# Patient Record
Sex: MALE | Race: WHITE | NOT HISPANIC OR LATINO | Employment: FULL TIME | URBAN - NONMETROPOLITAN AREA
[De-identification: names, ages, dates, MRNs, and addresses within clinical notes are randomized per-mention and may not be internally consistent; named-entity substitution may affect disease eponyms.]

---

## 2019-08-14 DIAGNOSIS — R07.9 CHEST PAIN, UNSPECIFIED TYPE: Primary | ICD-10-CM

## 2019-09-03 ENCOUNTER — APPOINTMENT (OUTPATIENT)
Dept: NUCLEAR MEDICINE | Facility: HOSPITAL | Age: 54
End: 2019-09-03

## 2019-09-04 ENCOUNTER — APPOINTMENT (OUTPATIENT)
Dept: CARDIOLOGY | Facility: HOSPITAL | Age: 54
End: 2019-09-04

## 2019-09-04 ENCOUNTER — TELEPHONE (OUTPATIENT)
Dept: GENERAL RADIOLOGY | Facility: HOSPITAL | Age: 54
End: 2019-09-04

## 2019-09-04 ENCOUNTER — APPOINTMENT (OUTPATIENT)
Dept: NUCLEAR MEDICINE | Facility: HOSPITAL | Age: 54
End: 2019-09-04

## 2019-09-05 ENCOUNTER — APPOINTMENT (OUTPATIENT)
Dept: CARDIOLOGY | Facility: HOSPITAL | Age: 54
End: 2019-09-05

## 2019-09-05 ENCOUNTER — APPOINTMENT (OUTPATIENT)
Dept: NUCLEAR MEDICINE | Facility: HOSPITAL | Age: 54
End: 2019-09-05

## 2019-09-18 ENCOUNTER — HOSPITAL ENCOUNTER (OUTPATIENT)
Dept: NUCLEAR MEDICINE | Facility: HOSPITAL | Age: 54
Discharge: HOME | End: 2019-09-18

## 2019-09-18 DIAGNOSIS — R07.9 CHEST PAIN, UNSPECIFIED TYPE: ICD-10-CM

## 2019-09-18 PROCEDURE — 93017 CV STRESS TEST TRACING ONLY: CPT

## 2019-09-18 PROCEDURE — A9500 TC99M SESTAMIBI: HCPCS | Performed by: INTERNAL MEDICINE

## 2019-09-18 PROCEDURE — 78452 HT MUSCLE IMAGE SPECT MULT: CPT

## 2019-09-18 PROCEDURE — 0 TECHNETIUM SESTAMIBI: Performed by: INTERNAL MEDICINE

## 2019-09-18 RX ORDER — TRAZODONE HYDROCHLORIDE 150 MG/1
150 TABLET ORAL NIGHTLY
COMMUNITY

## 2019-09-18 RX ORDER — MAGNESIUM OXIDE 400 MG/1
400 TABLET ORAL 2 TIMES DAILY
COMMUNITY

## 2019-09-18 RX ORDER — DIAZEPAM 5 MG/1
5 TABLET ORAL NIGHTLY PRN
COMMUNITY

## 2019-09-18 RX ORDER — MELOXICAM 15 MG/1
15 TABLET ORAL DAILY
COMMUNITY

## 2019-09-18 RX ORDER — ISOSORBIDE MONONITRATE 30 MG/1
30 TABLET, EXTENDED RELEASE ORAL DAILY
Status: ON HOLD | COMMUNITY
End: 2020-01-23

## 2019-09-18 RX ORDER — PROPRANOLOL HYDROCHLORIDE 40 MG/1
40 TABLET ORAL 2 TIMES DAILY
COMMUNITY
End: 2019-09-19

## 2019-09-18 RX ADMIN — TECHNETIUM TC 99M SESTAMIBI 1 DOSE: 1 INJECTION INTRAVENOUS at 07:27

## 2019-09-19 ENCOUNTER — HOSPITAL ENCOUNTER (OUTPATIENT)
Dept: CARDIOLOGY | Facility: HOSPITAL | Age: 54
Discharge: HOME | End: 2019-09-19

## 2019-09-19 ENCOUNTER — HOSPITAL ENCOUNTER (OUTPATIENT)
Dept: NUCLEAR MEDICINE | Facility: HOSPITAL | Age: 54
Discharge: HOME | End: 2019-09-19

## 2019-09-19 PROCEDURE — A9500 TC99M SESTAMIBI: HCPCS | Performed by: INTERNAL MEDICINE

## 2019-09-19 PROCEDURE — 0 TECHNETIUM SESTAMIBI: Performed by: INTERNAL MEDICINE

## 2019-09-19 RX ORDER — PROPRANOLOL HYDROCHLORIDE 40 MG/1
40 TABLET ORAL DAILY
Status: ON HOLD | COMMUNITY
End: 2020-01-23

## 2019-09-19 RX ADMIN — TECHNETIUM TC 99M SESTAMIBI 1 DOSE: 1 INJECTION INTRAVENOUS at 08:17

## 2019-10-22 LAB
BH CV STRESS BP STAGE 1: NORMAL
BH CV STRESS BP STAGE 2: NORMAL
BH CV STRESS BP STAGE 3: NORMAL
BH CV STRESS DURATION MIN STAGE 1: 3
BH CV STRESS DURATION MIN STAGE 2: 3
BH CV STRESS DURATION MIN STAGE 3: 3
BH CV STRESS DURATION SEC STAGE 1: 0
BH CV STRESS DURATION SEC STAGE 2: 0
BH CV STRESS DURATION SEC STAGE 3: 0
BH CV STRESS GRADE STAGE 1: 10
BH CV STRESS GRADE STAGE 2: 12
BH CV STRESS GRADE STAGE 3: 14
BH CV STRESS HR STAGE 1: 97
BH CV STRESS HR STAGE 2: 122
BH CV STRESS HR STAGE 3: 151
BH CV STRESS METS STAGE 1: 5
BH CV STRESS METS STAGE 2: 7.5
BH CV STRESS METS STAGE 3: 10
BH CV STRESS PROTOCOL 1: NORMAL
BH CV STRESS RECOVERY BP: NORMAL MMHG
BH CV STRESS RECOVERY HR: 92 BPM
BH CV STRESS SPEED STAGE 1: 1.7
BH CV STRESS SPEED STAGE 2: 2.5
BH CV STRESS SPEED STAGE 3: 3.4
BH CV STRESS STAGE 1: 1
BH CV STRESS STAGE 2: 2
BH CV STRESS STAGE 3: 3
LV EF NUC BP: 79 %
MAXIMAL PREDICTED HEART RATE: 165 BPM
PERCENT MAX PREDICTED HR: 91.52 %
STRESS BASELINE BP: NORMAL MMHG
STRESS BASELINE HR: 71 BPM
STRESS PERCENT HR: 108 %
STRESS POST ESTIMATED WORKLOAD: 10.4 METS
STRESS POST EXERCISE DUR MIN: 8 MIN
STRESS POST EXERCISE DUR SEC: 25 SEC
STRESS POST PEAK BP: NORMAL MMHG
STRESS POST PEAK HR: 151 BPM
STRESS TARGET HR: 140 BPM

## 2020-01-23 ENCOUNTER — APPOINTMENT (OUTPATIENT)
Dept: GENERAL RADIOLOGY | Facility: HOSPITAL | Age: 55
End: 2020-01-23

## 2020-01-23 ENCOUNTER — HOSPITAL ENCOUNTER (OUTPATIENT)
Facility: HOSPITAL | Age: 55
Setting detail: OBSERVATION
Discharge: HOME | End: 2020-01-24
Attending: EMERGENCY MEDICINE | Admitting: INTERNAL MEDICINE

## 2020-01-23 DIAGNOSIS — I11.9 BENIGN HYPERTENSIVE HEART DISEASE WITHOUT HEART FAILURE: ICD-10-CM

## 2020-01-23 DIAGNOSIS — I49.3 PVC (PREMATURE VENTRICULAR CONTRACTION): ICD-10-CM

## 2020-01-23 DIAGNOSIS — R55 NEAR SYNCOPE: ICD-10-CM

## 2020-01-23 DIAGNOSIS — I10 BENIGN HYPERTENSION: ICD-10-CM

## 2020-01-23 DIAGNOSIS — R07.9 CHEST PAIN, UNSPECIFIED TYPE: Primary | ICD-10-CM

## 2020-01-23 DIAGNOSIS — R00.2 HEART PALPITATIONS: ICD-10-CM

## 2020-01-23 LAB
ALBUMIN SERPL-MCNC: 4.4 G/DL (ref 3.5–5.2)
ALBUMIN/GLOB SERPL: 1.5 G/DL
ALP SERPL-CCNC: 30 U/L (ref 39–117)
ALT SERPL W P-5'-P-CCNC: 27 U/L (ref 1–41)
ANION GAP SERPL CALCULATED.3IONS-SCNC: 13 MMOL/L (ref 5–15)
AST SERPL-CCNC: 17 U/L (ref 1–40)
BASOPHILS # BLD AUTO: 0.02 10*3/MM3 (ref 0–0.2)
BASOPHILS NFR BLD AUTO: 0.2 % (ref 0–1.5)
BILIRUB SERPL-MCNC: 0.6 MG/DL (ref 0.2–1.2)
BUN BLD-MCNC: 14 MG/DL (ref 6–20)
BUN/CREAT SERPL: 11.6 (ref 7–25)
CALCIUM SPEC-SCNC: 9.5 MG/DL (ref 8.6–10.5)
CHLORIDE SERPL-SCNC: 101 MMOL/L (ref 98–107)
CHOLEST SERPL-MCNC: 218 MG/DL (ref 0–200)
CO2 SERPL-SCNC: 24 MMOL/L (ref 22–29)
CREAT BLD-MCNC: 1.21 MG/DL (ref 0.76–1.27)
DEPRECATED RDW RBC AUTO: 36.7 FL (ref 37–54)
EOSINOPHIL # BLD AUTO: 0.04 10*3/MM3 (ref 0–0.4)
EOSINOPHIL NFR BLD AUTO: 0.4 % (ref 0.3–6.2)
ERYTHROCYTE [DISTWIDTH] IN BLOOD BY AUTOMATED COUNT: 11.5 % (ref 12.3–15.4)
GFR SERPL CREATININE-BSD FRML MDRD: 62 ML/MIN/1.73
GLOBULIN UR ELPH-MCNC: 3 GM/DL
GLUCOSE BLD-MCNC: 99 MG/DL (ref 65–99)
HCT VFR BLD AUTO: 44.9 % (ref 37.5–51)
HDLC SERPL-MCNC: 49 MG/DL (ref 40–60)
HGB BLD-MCNC: 15.7 G/DL (ref 13–17.7)
HOLD SPECIMEN: NORMAL
HOLD SPECIMEN: NORMAL
IMM GRANULOCYTES # BLD AUTO: 0.07 10*3/MM3 (ref 0–0.05)
IMM GRANULOCYTES NFR BLD AUTO: 0.7 % (ref 0–0.5)
LDLC SERPL CALC-MCNC: 148 MG/DL (ref 0–100)
LDLC/HDLC SERPL: 3.03 {RATIO}
LYMPHOCYTES # BLD AUTO: 2.59 10*3/MM3 (ref 0.7–3.1)
LYMPHOCYTES NFR BLD AUTO: 27.7 % (ref 19.6–45.3)
MAGNESIUM SERPL-MCNC: 2.3 MG/DL (ref 1.6–2.6)
MCH RBC QN AUTO: 31 PG (ref 26.6–33)
MCHC RBC AUTO-ENTMCNC: 35 G/DL (ref 31.5–35.7)
MCV RBC AUTO: 88.7 FL (ref 79–97)
MONOCYTES # BLD AUTO: 0.71 10*3/MM3 (ref 0.1–0.9)
MONOCYTES NFR BLD AUTO: 7.6 % (ref 5–12)
NEUTROPHILS # BLD AUTO: 5.93 10*3/MM3 (ref 1.7–7)
NEUTROPHILS NFR BLD AUTO: 63.4 % (ref 42.7–76)
NRBC BLD AUTO-RTO: 0 /100 WBC (ref 0–0.2)
NT-PROBNP SERPL-MCNC: 119.2 PG/ML (ref 5–900)
PLATELET # BLD AUTO: 268 10*3/MM3 (ref 140–450)
PMV BLD AUTO: 10.5 FL (ref 6–12)
POTASSIUM BLD-SCNC: 4.1 MMOL/L (ref 3.5–5.2)
PROT SERPL-MCNC: 7.4 G/DL (ref 6–8.5)
RBC # BLD AUTO: 5.06 10*6/MM3 (ref 4.14–5.8)
SODIUM BLD-SCNC: 138 MMOL/L (ref 136–145)
TRIGL SERPL-MCNC: 103 MG/DL (ref 0–150)
TROPONIN T SERPL-MCNC: <0.01 NG/ML (ref 0–0.03)
TROPONIN T SERPL-MCNC: <0.01 NG/ML (ref 0–0.03)
VLDLC SERPL-MCNC: 20.6 MG/DL
WBC NRBC COR # BLD: 9.36 10*3/MM3 (ref 3.4–10.8)
WHOLE BLOOD HOLD SPECIMEN: NORMAL
WHOLE BLOOD HOLD SPECIMEN: NORMAL

## 2020-01-23 PROCEDURE — G0378 HOSPITAL OBSERVATION PER HR: HCPCS

## 2020-01-23 PROCEDURE — 36415 COLL VENOUS BLD VENIPUNCTURE: CPT | Performed by: EMERGENCY MEDICINE

## 2020-01-23 PROCEDURE — 93005 ELECTROCARDIOGRAM TRACING: CPT

## 2020-01-23 PROCEDURE — 71045 X-RAY EXAM CHEST 1 VIEW: CPT

## 2020-01-23 PROCEDURE — 96365 THER/PROPH/DIAG IV INF INIT: CPT

## 2020-01-23 PROCEDURE — 84484 ASSAY OF TROPONIN QUANT: CPT | Performed by: EMERGENCY MEDICINE

## 2020-01-23 PROCEDURE — 80061 LIPID PANEL: CPT | Performed by: INTERNAL MEDICINE

## 2020-01-23 PROCEDURE — 25010000002 ENOXAPARIN PER 10 MG: Performed by: INTERNAL MEDICINE

## 2020-01-23 PROCEDURE — 80053 COMPREHEN METABOLIC PANEL: CPT | Performed by: EMERGENCY MEDICINE

## 2020-01-23 PROCEDURE — 83735 ASSAY OF MAGNESIUM: CPT | Performed by: EMERGENCY MEDICINE

## 2020-01-23 PROCEDURE — 85025 COMPLETE CBC W/AUTO DIFF WBC: CPT | Performed by: EMERGENCY MEDICINE

## 2020-01-23 PROCEDURE — 25010000002 MAGNESIUM SULFATE 2 GM/50ML SOLUTION: Performed by: EMERGENCY MEDICINE

## 2020-01-23 PROCEDURE — 96361 HYDRATE IV INFUSION ADD-ON: CPT

## 2020-01-23 PROCEDURE — 93005 ELECTROCARDIOGRAM TRACING: CPT | Performed by: EMERGENCY MEDICINE

## 2020-01-23 PROCEDURE — 96372 THER/PROPH/DIAG INJ SC/IM: CPT

## 2020-01-23 PROCEDURE — 96366 THER/PROPH/DIAG IV INF ADDON: CPT

## 2020-01-23 PROCEDURE — 83880 ASSAY OF NATRIURETIC PEPTIDE: CPT | Performed by: EMERGENCY MEDICINE

## 2020-01-23 PROCEDURE — 99284 EMERGENCY DEPT VISIT MOD MDM: CPT

## 2020-01-23 PROCEDURE — 93010 ELECTROCARDIOGRAM REPORT: CPT | Performed by: INTERNAL MEDICINE

## 2020-01-23 RX ORDER — ASPIRIN 81 MG/1
81 TABLET ORAL DAILY
Status: DISCONTINUED | OUTPATIENT
Start: 2020-01-23 | End: 2020-01-24 | Stop reason: HOSPADM

## 2020-01-23 RX ORDER — NITROGLYCERIN 0.4 MG/1
0.4 TABLET SUBLINGUAL
Status: DISCONTINUED | OUTPATIENT
Start: 2020-01-23 | End: 2020-01-24 | Stop reason: HOSPADM

## 2020-01-23 RX ORDER — SODIUM CHLORIDE 0.9 % (FLUSH) 0.9 %
10 SYRINGE (ML) INJECTION AS NEEDED
Status: DISCONTINUED | OUTPATIENT
Start: 2020-01-23 | End: 2020-01-24 | Stop reason: HOSPADM

## 2020-01-23 RX ORDER — ONDANSETRON 4 MG/1
4 TABLET, FILM COATED ORAL EVERY 6 HOURS PRN
Status: DISCONTINUED | OUTPATIENT
Start: 2020-01-23 | End: 2020-01-24 | Stop reason: HOSPADM

## 2020-01-23 RX ORDER — ACETAMINOPHEN 650 MG/1
650 SUPPOSITORY RECTAL EVERY 4 HOURS PRN
Status: DISCONTINUED | OUTPATIENT
Start: 2020-01-23 | End: 2020-01-24 | Stop reason: HOSPADM

## 2020-01-23 RX ORDER — MAGNESIUM OXIDE 400 MG/1
400 TABLET ORAL 2 TIMES DAILY
Status: DISCONTINUED | OUTPATIENT
Start: 2020-01-23 | End: 2020-01-24 | Stop reason: HOSPADM

## 2020-01-23 RX ORDER — PANTOPRAZOLE SODIUM 40 MG/1
40 TABLET, DELAYED RELEASE ORAL EVERY MORNING
Status: DISCONTINUED | OUTPATIENT
Start: 2020-01-24 | End: 2020-01-24 | Stop reason: HOSPADM

## 2020-01-23 RX ORDER — PREGABALIN 50 MG/1
50 CAPSULE ORAL 3 TIMES DAILY
Status: DISCONTINUED | OUTPATIENT
Start: 2020-01-23 | End: 2020-01-24 | Stop reason: HOSPADM

## 2020-01-23 RX ORDER — TRAZODONE HYDROCHLORIDE 150 MG/1
150 TABLET ORAL NIGHTLY
Status: DISCONTINUED | OUTPATIENT
Start: 2020-01-23 | End: 2020-01-24 | Stop reason: HOSPADM

## 2020-01-23 RX ORDER — PREGABALIN 50 MG/1
50 CAPSULE ORAL 3 TIMES DAILY
COMMUNITY

## 2020-01-23 RX ORDER — DIAZEPAM 5 MG/1
5 TABLET ORAL NIGHTLY PRN
Status: DISCONTINUED | OUTPATIENT
Start: 2020-01-23 | End: 2020-01-24 | Stop reason: HOSPADM

## 2020-01-23 RX ORDER — MAGNESIUM SULFATE HEPTAHYDRATE 40 MG/ML
2 INJECTION, SOLUTION INTRAVENOUS ONCE
Status: COMPLETED | OUTPATIENT
Start: 2020-01-23 | End: 2020-01-23

## 2020-01-23 RX ORDER — ONDANSETRON 2 MG/ML
4 INJECTION INTRAMUSCULAR; INTRAVENOUS EVERY 6 HOURS PRN
Status: DISCONTINUED | OUTPATIENT
Start: 2020-01-23 | End: 2020-01-24 | Stop reason: HOSPADM

## 2020-01-23 RX ORDER — SODIUM CHLORIDE 0.9 % (FLUSH) 0.9 %
10 SYRINGE (ML) INJECTION EVERY 12 HOURS SCHEDULED
Status: DISCONTINUED | OUTPATIENT
Start: 2020-01-23 | End: 2020-01-24 | Stop reason: HOSPADM

## 2020-01-23 RX ORDER — BISACODYL 5 MG/1
5 TABLET, DELAYED RELEASE ORAL DAILY PRN
Status: DISCONTINUED | OUTPATIENT
Start: 2020-01-23 | End: 2020-01-24 | Stop reason: HOSPADM

## 2020-01-23 RX ORDER — ACETAMINOPHEN 160 MG/5ML
650 SOLUTION ORAL EVERY 4 HOURS PRN
Status: DISCONTINUED | OUTPATIENT
Start: 2020-01-23 | End: 2020-01-23 | Stop reason: SDUPTHER

## 2020-01-23 RX ORDER — ACETAMINOPHEN 325 MG/1
650 TABLET ORAL EVERY 4 HOURS PRN
Status: DISCONTINUED | OUTPATIENT
Start: 2020-01-23 | End: 2020-01-24 | Stop reason: HOSPADM

## 2020-01-23 RX ORDER — SODIUM CHLORIDE 9 MG/ML
100 INJECTION, SOLUTION INTRAVENOUS CONTINUOUS
Status: DISCONTINUED | OUTPATIENT
Start: 2020-01-23 | End: 2020-01-24

## 2020-01-23 RX ADMIN — TRAZODONE HYDROCHLORIDE 150 MG: 150 TABLET ORAL at 21:44

## 2020-01-23 RX ADMIN — Medication 400 MG: at 21:44

## 2020-01-23 RX ADMIN — PREGABALIN 50 MG: 50 CAPSULE ORAL at 21:44

## 2020-01-23 RX ADMIN — METOPROLOL TARTRATE 25 MG: 25 TABLET ORAL at 21:44

## 2020-01-23 RX ADMIN — ENOXAPARIN SODIUM 40 MG: 40 INJECTION SUBCUTANEOUS at 17:29

## 2020-01-23 RX ADMIN — PREGABALIN 50 MG: 50 CAPSULE ORAL at 17:29

## 2020-01-23 RX ADMIN — MAGNESIUM SULFATE HEPTAHYDRATE 2 G: 40 INJECTION, SOLUTION INTRAVENOUS at 13:14

## 2020-01-23 RX ADMIN — SODIUM CHLORIDE 100 ML/HR: 9 INJECTION, SOLUTION INTRAVENOUS at 17:30

## 2020-01-23 RX ADMIN — ASPIRIN 81 MG: 81 TABLET, COATED ORAL at 17:29

## 2020-01-24 ENCOUNTER — APPOINTMENT (OUTPATIENT)
Dept: CARDIOLOGY | Facility: HOSPITAL | Age: 55
End: 2020-01-24

## 2020-01-24 VITALS
TEMPERATURE: 96.6 F | WEIGHT: 230.6 LBS | HEART RATE: 70 BPM | RESPIRATION RATE: 20 BRPM | BODY MASS INDEX: 29.59 KG/M2 | DIASTOLIC BLOOD PRESSURE: 68 MMHG | HEIGHT: 74 IN | SYSTOLIC BLOOD PRESSURE: 112 MMHG | OXYGEN SATURATION: 100 %

## 2020-01-24 LAB
ANION GAP SERPL CALCULATED.3IONS-SCNC: 11 MMOL/L (ref 5–15)
BASOPHILS # BLD AUTO: 0.01 10*3/MM3 (ref 0–0.2)
BASOPHILS NFR BLD AUTO: 0.1 % (ref 0–1.5)
BH CV ECHO MEAS - ACS: 2.2 CM
BH CV ECHO MEAS - AO ISTHMUS: 2.7 CM
BH CV ECHO MEAS - AO MAX PG (FULL): 3.1 MMHG
BH CV ECHO MEAS - AO MAX PG: 8.9 MMHG
BH CV ECHO MEAS - AO MEAN PG (FULL): 3 MMHG
BH CV ECHO MEAS - AO MEAN PG: 5 MMHG
BH CV ECHO MEAS - AO ROOT AREA (BSA CORRECTED): 1.5
BH CV ECHO MEAS - AO ROOT AREA: 9.6 CM^2
BH CV ECHO MEAS - AO ROOT DIAM: 3.5 CM
BH CV ECHO MEAS - AO V2 MAX: 149 CM/SEC
BH CV ECHO MEAS - AO V2 MEAN: 102 CM/SEC
BH CV ECHO MEAS - AO V2 VTI: 28.4 CM
BH CV ECHO MEAS - ASC AORTA: 3.2 CM
BH CV ECHO MEAS - AVA(I,A): 2.4 CM^2
BH CV ECHO MEAS - AVA(I,D): 2.4 CM^2
BH CV ECHO MEAS - AVA(V,A): 2.5 CM^2
BH CV ECHO MEAS - AVA(V,D): 2.5 CM^2
BH CV ECHO MEAS - BSA(HAYCOCK): 2.4 M^2
BH CV ECHO MEAS - BSA: 2.3 M^2
BH CV ECHO MEAS - BZI_BMI: 28.7 KILOGRAMS/M^2
BH CV ECHO MEAS - BZI_METRIC_HEIGHT: 190.5 CM
BH CV ECHO MEAS - BZI_METRIC_WEIGHT: 104.3 KG
BH CV ECHO MEAS - EDV(CUBED): 82.9 ML
BH CV ECHO MEAS - EDV(MOD-SP2): 96.2 ML
BH CV ECHO MEAS - EDV(MOD-SP4): 115 ML
BH CV ECHO MEAS - EDV(TEICH): 85.8 ML
BH CV ECHO MEAS - EF(CUBED): 71.5 %
BH CV ECHO MEAS - EF(MOD-SP2): 63.2 %
BH CV ECHO MEAS - EF(MOD-SP4): 71.3 %
BH CV ECHO MEAS - EF(TEICH): 63.4 %
BH CV ECHO MEAS - ESV(CUBED): 23.6 ML
BH CV ECHO MEAS - ESV(MOD-SP2): 35.4 ML
BH CV ECHO MEAS - ESV(MOD-SP4): 33 ML
BH CV ECHO MEAS - ESV(TEICH): 31.4 ML
BH CV ECHO MEAS - FS: 34.2 %
BH CV ECHO MEAS - IVS/LVPW: 0.65
BH CV ECHO MEAS - IVSD: 1.1 CM
BH CV ECHO MEAS - LA DIMENSION: 4.1 CM
BH CV ECHO MEAS - LA/AO: 1.2
BH CV ECHO MEAS - LV DIASTOLIC VOL/BSA (35-75): 49.4 ML/M^2
BH CV ECHO MEAS - LV MASS(C)D: 246.1 GRAMS
BH CV ECHO MEAS - LV MASS(C)DI: 105.7 GRAMS/M^2
BH CV ECHO MEAS - LV MAX PG: 5.8 MMHG
BH CV ECHO MEAS - LV MEAN PG: 2 MMHG
BH CV ECHO MEAS - LV SYSTOLIC VOL/BSA (12-30): 14.2 ML/M^2
BH CV ECHO MEAS - LV V1 MAX: 120 CM/SEC
BH CV ECHO MEAS - LV V1 MEAN: 69.7 CM/SEC
BH CV ECHO MEAS - LV V1 VTI: 22 CM
BH CV ECHO MEAS - LVIDD: 4.4 CM
BH CV ECHO MEAS - LVIDS: 2.9 CM
BH CV ECHO MEAS - LVLD AP2: 7.6 CM
BH CV ECHO MEAS - LVLD AP4: 7.5 CM
BH CV ECHO MEAS - LVLS AP2: 6.3 CM
BH CV ECHO MEAS - LVLS AP4: 6.3 CM
BH CV ECHO MEAS - LVOT AREA (M): 3.1 CM^2
BH CV ECHO MEAS - LVOT AREA: 3.1 CM^2
BH CV ECHO MEAS - LVOT DIAM: 2 CM
BH CV ECHO MEAS - LVPWD: 1.7 CM
BH CV ECHO MEAS - MR MAX PG: 35.8 MMHG
BH CV ECHO MEAS - MR MAX VEL: 299 CM/SEC
BH CV ECHO MEAS - MV A MAX VEL: 70.3 CM/SEC
BH CV ECHO MEAS - MV DEC SLOPE: 400 CM/SEC^2
BH CV ECHO MEAS - MV E MAX VEL: 82.3 CM/SEC
BH CV ECHO MEAS - MV E/A: 1.2
BH CV ECHO MEAS - MV MAX PG: 2.9 MMHG
BH CV ECHO MEAS - MV MEAN PG: 1 MMHG
BH CV ECHO MEAS - MV P1/2T MAX VEL: 84.3 CM/SEC
BH CV ECHO MEAS - MV P1/2T: 61.7 MSEC
BH CV ECHO MEAS - MV V2 MAX: 84.5 CM/SEC
BH CV ECHO MEAS - MV V2 MEAN: 53.7 CM/SEC
BH CV ECHO MEAS - MV V2 VTI: 28.3 CM
BH CV ECHO MEAS - MVA P1/2T LCG: 2.6 CM^2
BH CV ECHO MEAS - MVA(P1/2T): 3.6 CM^2
BH CV ECHO MEAS - MVA(VTI): 2.4 CM^2
BH CV ECHO MEAS - PA MAX PG: 8.1 MMHG
BH CV ECHO MEAS - PA V2 MAX: 142 CM/SEC
BH CV ECHO MEAS - PI END-D VEL: 67.3 CM/SEC
BH CV ECHO MEAS - RVDD: 2.8 CM
BH CV ECHO MEAS - SI(AO): 117.3 ML/M^2
BH CV ECHO MEAS - SI(CUBED): 25.4 ML/M^2
BH CV ECHO MEAS - SI(LVOT): 29.7 ML/M^2
BH CV ECHO MEAS - SI(MOD-SP2): 26.1 ML/M^2
BH CV ECHO MEAS - SI(MOD-SP4): 35.2 ML/M^2
BH CV ECHO MEAS - SI(TEICH): 23.4 ML/M^2
BH CV ECHO MEAS - SV(AO): 273.2 ML
BH CV ECHO MEAS - SV(CUBED): 59.2 ML
BH CV ECHO MEAS - SV(LVOT): 69.1 ML
BH CV ECHO MEAS - SV(MOD-SP2): 60.8 ML
BH CV ECHO MEAS - SV(MOD-SP4): 82 ML
BH CV ECHO MEAS - SV(TEICH): 54.4 ML
BH CV ECHO MEAS - TR MAX VEL: 247 CM/SEC
BUN BLD-MCNC: 17 MG/DL (ref 6–20)
BUN/CREAT SERPL: 13.1 (ref 7–25)
CALCIUM SPEC-SCNC: 8.8 MG/DL (ref 8.6–10.5)
CHLORIDE SERPL-SCNC: 105 MMOL/L (ref 98–107)
CO2 SERPL-SCNC: 23 MMOL/L (ref 22–29)
CREAT BLD-MCNC: 1.3 MG/DL (ref 0.76–1.27)
DEPRECATED RDW RBC AUTO: 36.5 FL (ref 37–54)
EOSINOPHIL # BLD AUTO: 0.05 10*3/MM3 (ref 0–0.4)
EOSINOPHIL NFR BLD AUTO: 0.7 % (ref 0.3–6.2)
ERYTHROCYTE [DISTWIDTH] IN BLOOD BY AUTOMATED COUNT: 11.5 % (ref 12.3–15.4)
GFR SERPL CREATININE-BSD FRML MDRD: 57 ML/MIN/1.73
GLUCOSE BLD-MCNC: 104 MG/DL (ref 65–99)
HCT VFR BLD AUTO: 41.2 % (ref 37.5–51)
HGB BLD-MCNC: 14.3 G/DL (ref 13–17.7)
IMM GRANULOCYTES # BLD AUTO: 0.05 10*3/MM3 (ref 0–0.05)
IMM GRANULOCYTES NFR BLD AUTO: 0.7 % (ref 0–0.5)
INR PPP: 1.13 (ref 0.8–1.2)
LYMPHOCYTES # BLD AUTO: 2.23 10*3/MM3 (ref 0.7–3.1)
LYMPHOCYTES NFR BLD AUTO: 31.3 % (ref 19.6–45.3)
MAGNESIUM SERPL-MCNC: 2.6 MG/DL (ref 1.6–2.6)
MAXIMAL PREDICTED HEART RATE: 164 BPM
MCH RBC QN AUTO: 30.7 PG (ref 26.6–33)
MCHC RBC AUTO-ENTMCNC: 34.7 G/DL (ref 31.5–35.7)
MCV RBC AUTO: 88.4 FL (ref 79–97)
MONOCYTES # BLD AUTO: 0.62 10*3/MM3 (ref 0.1–0.9)
MONOCYTES NFR BLD AUTO: 8.7 % (ref 5–12)
NEUTROPHILS # BLD AUTO: 4.17 10*3/MM3 (ref 1.7–7)
NEUTROPHILS NFR BLD AUTO: 58.5 % (ref 42.7–76)
NRBC BLD AUTO-RTO: 0 /100 WBC (ref 0–0.2)
PLATELET # BLD AUTO: 241 10*3/MM3 (ref 140–450)
PMV BLD AUTO: 10.5 FL (ref 6–12)
POTASSIUM BLD-SCNC: 4.4 MMOL/L (ref 3.5–5.2)
PROTHROMBIN TIME: 14.3 SECONDS (ref 11.1–15.3)
RBC # BLD AUTO: 4.66 10*6/MM3 (ref 4.14–5.8)
SODIUM BLD-SCNC: 139 MMOL/L (ref 136–145)
STRESS TARGET HR: 139 BPM
WBC NRBC COR # BLD: 7.13 10*3/MM3 (ref 3.4–10.8)

## 2020-01-24 PROCEDURE — 93458 L HRT ARTERY/VENTRICLE ANGIO: CPT | Performed by: INTERNAL MEDICINE

## 2020-01-24 PROCEDURE — 25010000002 FENTANYL CITRATE (PF) 100 MCG/2ML SOLUTION: Performed by: INTERNAL MEDICINE

## 2020-01-24 PROCEDURE — 85025 COMPLETE CBC W/AUTO DIFF WBC: CPT | Performed by: INTERNAL MEDICINE

## 2020-01-24 PROCEDURE — C1760 CLOSURE DEV, VASC: HCPCS | Performed by: INTERNAL MEDICINE

## 2020-01-24 PROCEDURE — 93306 TTE W/DOPPLER COMPLETE: CPT

## 2020-01-24 PROCEDURE — 25010000002 MIDAZOLAM PER 1 MG: Performed by: INTERNAL MEDICINE

## 2020-01-24 PROCEDURE — G0378 HOSPITAL OBSERVATION PER HR: HCPCS

## 2020-01-24 PROCEDURE — 80048 BASIC METABOLIC PNL TOTAL CA: CPT | Performed by: INTERNAL MEDICINE

## 2020-01-24 PROCEDURE — 83735 ASSAY OF MAGNESIUM: CPT | Performed by: INTERNAL MEDICINE

## 2020-01-24 PROCEDURE — 0 IOPAMIDOL PER 1 ML: Performed by: INTERNAL MEDICINE

## 2020-01-24 PROCEDURE — 85610 PROTHROMBIN TIME: CPT | Performed by: INTERNAL MEDICINE

## 2020-01-24 PROCEDURE — 96361 HYDRATE IV INFUSION ADD-ON: CPT

## 2020-01-24 PROCEDURE — C1894 INTRO/SHEATH, NON-LASER: HCPCS | Performed by: INTERNAL MEDICINE

## 2020-01-24 PROCEDURE — C1769 GUIDE WIRE: HCPCS | Performed by: INTERNAL MEDICINE

## 2020-01-24 RX ORDER — MIDAZOLAM HYDROCHLORIDE 1 MG/ML
INJECTION INTRAMUSCULAR; INTRAVENOUS AS NEEDED
Status: DISCONTINUED | OUTPATIENT
Start: 2020-01-24 | End: 2020-01-24 | Stop reason: HOSPADM

## 2020-01-24 RX ORDER — LIDOCAINE HYDROCHLORIDE 20 MG/ML
INJECTION, SOLUTION INFILTRATION; PERINEURAL AS NEEDED
Status: DISCONTINUED | OUTPATIENT
Start: 2020-01-24 | End: 2020-01-24 | Stop reason: HOSPADM

## 2020-01-24 RX ORDER — SODIUM CHLORIDE 9 MG/ML
75 INJECTION, SOLUTION INTRAVENOUS CONTINUOUS
Status: DISCONTINUED | OUTPATIENT
Start: 2020-01-24 | End: 2020-01-24 | Stop reason: HOSPADM

## 2020-01-24 RX ORDER — SODIUM CHLORIDE 0.9 % (FLUSH) 0.9 %
3 SYRINGE (ML) INJECTION EVERY 12 HOURS SCHEDULED
Status: DISCONTINUED | OUTPATIENT
Start: 2020-01-24 | End: 2020-01-24

## 2020-01-24 RX ORDER — SODIUM CHLORIDE 0.9 % (FLUSH) 0.9 %
10 SYRINGE (ML) INJECTION AS NEEDED
Status: DISCONTINUED | OUTPATIENT
Start: 2020-01-24 | End: 2020-01-24

## 2020-01-24 RX ORDER — FENTANYL CITRATE 50 UG/ML
INJECTION, SOLUTION INTRAMUSCULAR; INTRAVENOUS AS NEEDED
Status: DISCONTINUED | OUTPATIENT
Start: 2020-01-24 | End: 2020-01-24 | Stop reason: HOSPADM

## 2020-01-24 RX ORDER — SODIUM CHLORIDE 9 MG/ML
100 INJECTION, SOLUTION INTRAVENOUS CONTINUOUS
Status: DISCONTINUED | OUTPATIENT
Start: 2020-01-24 | End: 2020-01-24 | Stop reason: HOSPADM

## 2020-01-24 RX ADMIN — PREGABALIN 50 MG: 50 CAPSULE ORAL at 11:22

## 2020-01-24 RX ADMIN — SODIUM CHLORIDE 100 ML/HR: 9 INJECTION, SOLUTION INTRAVENOUS at 02:42

## 2020-01-24 RX ADMIN — METOPROLOL TARTRATE 25 MG: 25 TABLET ORAL at 11:22

## 2020-01-24 RX ADMIN — ASPIRIN 81 MG: 81 TABLET, COATED ORAL at 11:22

## 2020-01-24 RX ADMIN — Medication 400 MG: at 11:22

## 2020-11-19 ENCOUNTER — OFFICE VISIT (OUTPATIENT)
Dept: SURGERY | Facility: CLINIC | Age: 55
End: 2020-11-19

## 2020-11-19 VITALS
DIASTOLIC BLOOD PRESSURE: 84 MMHG | HEART RATE: 56 BPM | BODY MASS INDEX: 29.86 KG/M2 | WEIGHT: 232.7 LBS | HEIGHT: 74 IN | SYSTOLIC BLOOD PRESSURE: 120 MMHG | TEMPERATURE: 98.1 F

## 2020-11-19 DIAGNOSIS — R10.30 LOWER ABDOMINAL PAIN: Primary | ICD-10-CM

## 2020-11-19 PROCEDURE — 99204 OFFICE O/P NEW MOD 45 MIN: CPT | Performed by: SURGERY

## 2020-11-19 RX ORDER — IBUPROFEN 200 MG
600 TABLET ORAL DAILY
COMMUNITY

## 2020-11-19 RX ORDER — GABAPENTIN 300 MG/1
300 CAPSULE ORAL 2 TIMES DAILY
COMMUNITY

## 2020-11-19 RX ORDER — OMEPRAZOLE 20 MG/1
20 TABLET, DELAYED RELEASE ORAL DAILY
COMMUNITY

## 2020-11-19 NOTE — PROGRESS NOTES
CHIEF COMPLAINT:    Lower abdominal pain, cramps    HISTORY OF PRESENT ILLNESS:    Kareem Bonds is a 55 y.o. male who has a remote history of an open right inguinal hernia repair done more than 10 years ago.  He states he now has pain in this region with a sensation of pressure and a dull ache.  This has developed recently.  This discomfort was preceded by several days of constant cramping and pain across his lower abdomen with a feeling of tightness in this region.  He says that the pain at that time was so severe across his lower abdomen that he was not able to go to work for several days.  That pain has since resolved that has evolved into the pain in his right groin.  He notes no bulge in that area.  He notes continued intermittent diarrhea which has been a longstanding baseline for him.  He states that he is up-to-date with colonoscopy.    Although he notes increased pressure in the right groin while he is seated and driving he does not believe that pressure from his clothing or from items in his pocket is contributing to the pain.    Past Medical History:   Diagnosis Date   • Anxiety    • Bradycardia    • HTN (hypertension)    • Hyperlipemia        Past Surgical History:   Procedure Laterality Date   • CARDIAC CATHETERIZATION N/A 1/24/2020    Procedure: Left Heart Cath;  Surgeon: Elio Butler MD;  Location: [facility] CATH INVASIVE LOCATION;  Service: Cardiology   • EAR TUBES     • HERNIA REPAIR     • KIDNEY STONE SURGERY     • SEPTOPLASTY     • TONSILLECTOMY         Prior to Admission medications    Medication Sig Start Date End Date Taking? Authorizing Provider   diazePAM (VALIUM) 5 MG tablet Take 5 mg by mouth At Night As Needed for Anxiety.   Yes Jose Guadalupe Hoang MD   esomeprazole (nexIUM) 20 MG capsule Take 20 mg by mouth Every Morning Before Breakfast.   Yes Jose Guadalupe Haong MD   gabapentin (NEURONTIN) 300 MG capsule Take 300 mg by mouth 2 (Two) Times a Day.   Yes Jose Guadalupe Hoang MD    ibuprofen (ADVIL,MOTRIN) 200 MG tablet Take 600 mg by mouth Daily.   Yes Jose Guadalupe Hoang MD   meloxicam (MOBIC) 15 MG tablet Take 15 mg by mouth Daily.   Yes Jose Guadalupe Hoang MD   metoprolol tartrate (LOPRESSOR) 25 MG tablet Take 25 mg by mouth Daily.   Yes Jose Guadalupe Hoang MD   traZODone (DESYREL) 150 MG tablet Take 150 mg by mouth Every Night.   Yes Jose Guadalupe Hoang MD   magnesium oxide (MAG-OX) 400 MG tablet Take 400 mg by mouth 2 (Two) Times a Day.    Jose Guadalupe Hoang MD   omeprazole OTC (PrilOSEC OTC) 20 MG EC tablet Take 20 mg by mouth Daily.    Jose Guadalupe Hoang MD   pregabalin (LYRICA) 50 MG capsule Take 50 mg by mouth 3 (Three) Times a Day.    Jose Guadalupe Hoang MD       No Known Allergies    History reviewed. No pertinent family history.    Social History     Socioeconomic History   • Marital status:      Spouse name: Not on file   • Number of children: Not on file   • Years of education: Not on file   • Highest education level: Not on file   Tobacco Use   • Smoking status: Former Smoker   • Smokeless tobacco: Never Used   Substance and Sexual Activity   • Alcohol use: Yes     Alcohol/week: 2.0 standard drinks     Types: 2 Cans of beer per week     Comment: couple beers daily   • Sexual activity: Defer       Review of Systems   Constitutional: Negative for activity change, appetite change, chills and fever.   HENT: Negative for hearing loss, nosebleeds and trouble swallowing.    Cardiovascular: Negative for chest pain, palpitations and leg swelling.   Gastrointestinal: Positive for abdominal pain and diarrhea. Negative for abdominal distention, anal bleeding, blood in stool, constipation, nausea, rectal pain and vomiting.   Endocrine: Negative for cold intolerance, heat intolerance, polydipsia and polyuria.   Genitourinary: Negative for decreased urine volume, difficulty urinating, dysuria, enuresis, frequency, hematuria and urgency.   Musculoskeletal: Negative for  "arthralgias, back pain, gait problem, myalgias and neck pain.   Skin: Negative for pallor, rash and wound.   Allergic/Immunologic: Negative for immunocompromised state.   Neurological: Positive for headaches. Negative for dizziness, seizures, weakness, light-headedness and numbness.   Psychiatric/Behavioral: Negative for agitation and behavioral problems. The patient is not nervous/anxious.        Objective     /84   Pulse 56   Temp 98.1 °F (36.7 °C) (Temporal)   Ht 188 cm (74\")   Wt 106 kg (232 lb 11.2 oz)   BMI 29.88 kg/m²     Physical Exam  Constitutional:       General: He is not in acute distress.     Appearance: Normal appearance. He is not ill-appearing, toxic-appearing or diaphoretic.   HENT:      Head: Normocephalic and atraumatic.   Eyes:      General: No scleral icterus.        Right eye: No discharge.         Left eye: No discharge.      Extraocular Movements: Extraocular movements intact.      Conjunctiva/sclera: Conjunctivae normal.   Cardiovascular:      Rate and Rhythm: Normal rate and regular rhythm.   Pulmonary:      Effort: Pulmonary effort is normal. No respiratory distress.   Abdominal:      General: There is no distension.      Palpations: Abdomen is soft. There is no mass.      Tenderness: There is no abdominal tenderness. There is no guarding or rebound.      Hernia: No hernia is present.   Skin:     General: Skin is warm.   Neurological:      General: No focal deficit present.      Mental Status: He is alert and oriented to person, place, and time.   Psychiatric:         Mood and Affect: Mood normal.         Behavior: Behavior normal.         Thought Content: Thought content normal.         Judgment: Judgment normal.           ASSESSMENT:    Lower abdominal pain/groin pain    PLAN:    On exam he has well-healed open inguinal hernia repair site with no evidence of recurrent hernia.  Given that he had several days of severe lower abdominal pain the current pain that he is feeling " may be residual pain from that.  It is unclear what the etiology of that pain was.  I will order baseline lab work as well as a CT of the abdomen pelvis for further evaluation.  He can come see us after the studies have been completed.          This document has been electronically signed by Sacha Magdaleno MD on November 19, 2020 10:03 CST

## 2020-11-24 ENCOUNTER — HOSPITAL ENCOUNTER (OUTPATIENT)
Dept: CT IMAGING | Facility: HOSPITAL | Age: 55
Discharge: HOME | End: 2020-11-24
Admitting: SURGERY

## 2020-11-24 PROCEDURE — 25010000002 IOPAMIDOL 61 % SOLUTION: Performed by: SURGERY

## 2020-11-24 PROCEDURE — 74177 CT ABD & PELVIS W/CONTRAST: CPT

## 2020-11-24 RX ADMIN — IOPAMIDOL 90 ML: 612 INJECTION, SOLUTION INTRAVENOUS at 15:28

## 2020-11-30 ENCOUNTER — OFFICE VISIT (OUTPATIENT)
Dept: SURGERY | Facility: CLINIC | Age: 55
End: 2020-11-30

## 2020-11-30 VITALS
WEIGHT: 230 LBS | HEIGHT: 74 IN | HEART RATE: 80 BPM | BODY MASS INDEX: 29.52 KG/M2 | TEMPERATURE: 98.4 F | SYSTOLIC BLOOD PRESSURE: 110 MMHG | DIASTOLIC BLOOD PRESSURE: 70 MMHG

## 2020-11-30 DIAGNOSIS — R10.30 LOWER ABDOMINAL PAIN: Primary | ICD-10-CM

## 2020-11-30 PROCEDURE — 99212 OFFICE O/P EST SF 10 MIN: CPT | Performed by: SURGERY

## 2020-11-30 NOTE — PROGRESS NOTES
"CHIEF COMPLAINT:    Chief Complaint   Patient presents with   • Follow-up     Raimundo lower abdominal pain. CT results.       HISTORY OF PRESENT ILLNESS:    Kareem Bonds is a 55 y.o. male who underwent abdominal CT scan to evaluate for sources of lower abdominal pain.  The patient had previously experienced an episode of \"tearing\" across his lower abdomen which has been followed by dull but improving pain.  He had a prior inguinal hernia repair and was concerned about this despite no evidence of recurrence.  On CT scan there was questionable very mild diverticulitis with no other significant findings.  The patient denies fevers or chills, no issues with eating or drinking.  Having regular bowel function.    EXAM:  Vitals:    11/30/20 1258   BP: 110/70   Pulse: 80   Temp: 98.4 °F (36.9 °C)         Abdomen soft, minimally tender across lower abdomen along beltline    ASSESSMENT:    Lower abdominal pain, possible muscular strain    PLAN:    Currently there is no indication that he has a surgical issue.  I did discuss with him that he may have had mild diverticulitis however I doubt that this was the case based on my impression of the CT images, furthermore the patient's been experiencing symptoms that began acutely with activity approximately 3 weeks ago.  I suspect that he strained his abdominal wall musculature.  I advised that he rest and use NSAIDs if needed.  I offered to see him back in 2 weeks for reassessment, he states that he may follow-up with his primary care provider.          This document has been electronically signed by Sacha Magdaleno MD on November 30, 2020 13:25 CST      "

## (undated) DEVICE — CATH DIAG EXPO M/ PK 6FR FL4/FR4 PIG 3PK

## (undated) DEVICE — MODEL BT2000 P/N 700287-012KIT CONTENTS: MANIFOLD WITH SALINE AND CONTRAST PORTS, SALINE TUBING WITH SPIKE AND HAND SYRINGE, TRANSDUCER: Brand: BT2000 AUTOMATED MANIFOLD KIT

## (undated) DEVICE — ELECTRODE,RT,STRESS,FOAM,50PK: Brand: MEDLINE

## (undated) DEVICE — INTRO SHEATH ART/FEM ENGAGE .038 6F12CM

## (undated) DEVICE — PK CATH LAB 60

## (undated) DEVICE — PERCLOSE PROGLIDE™ SUTURE-MEDIATED CLOSURE SYSTEM: Brand: PERCLOSE PROGLIDE™

## (undated) DEVICE — GW PERIPH GUIDERIGHT STD/J/TP PTFE/PCOAT SS 0.038IN 5X150CM

## (undated) DEVICE — KT INTRO MINISTICK MAX W/GW NITNL/TUNG ECHO 4F 21G 7CM

## (undated) DEVICE — A2000 MULTI-USE SYRINGE KIT, P/N 701277-003KIT CONTENTS: 100ML CONTRAST RESERVOIR AND TUBING WITH CONTRAST SPIKE AND CLAMP: Brand: A2000 MULTI-USE SYRINGE KIT